# Patient Record
Sex: MALE | Race: WHITE | Employment: FULL TIME | URBAN - METROPOLITAN AREA
[De-identification: names, ages, dates, MRNs, and addresses within clinical notes are randomized per-mention and may not be internally consistent; named-entity substitution may affect disease eponyms.]

---
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
## 2020-08-31 ENCOUNTER — OFFICE VISIT (OUTPATIENT)
Dept: FAMILY MEDICINE CLINIC | Age: 20
End: 2020-08-31
Payer: COMMERCIAL

## 2020-08-31 VITALS
DIASTOLIC BLOOD PRESSURE: 74 MMHG | TEMPERATURE: 98.6 F | OXYGEN SATURATION: 98 % | SYSTOLIC BLOOD PRESSURE: 126 MMHG | WEIGHT: 185.6 LBS | HEART RATE: 72 BPM

## 2020-08-31 PROCEDURE — G8427 DOCREV CUR MEDS BY ELIG CLIN: HCPCS | Performed by: FAMILY MEDICINE

## 2020-08-31 PROCEDURE — G8421 BMI NOT CALCULATED: HCPCS | Performed by: FAMILY MEDICINE

## 2020-08-31 PROCEDURE — 1036F TOBACCO NON-USER: CPT | Performed by: FAMILY MEDICINE

## 2020-08-31 PROCEDURE — 99213 OFFICE O/P EST LOW 20 MIN: CPT | Performed by: FAMILY MEDICINE

## 2020-08-31 RX ORDER — DEXTROAMPHETAMINE SACCHARATE, AMPHETAMINE ASPARTATE MONOHYDRATE, DEXTROAMPHETAMINE SULFATE AND AMPHETAMINE SULFATE 3.75; 3.75; 3.75; 3.75 MG/1; MG/1; MG/1; MG/1
15 CAPSULE, EXTENDED RELEASE ORAL EVERY MORNING
Qty: 30 CAPSULE | Refills: 0 | Status: SHIPPED | OUTPATIENT
Start: 2020-08-31 | End: 2020-09-28

## 2020-08-31 ASSESSMENT — PATIENT HEALTH QUESTIONNAIRE - PHQ9
1. LITTLE INTEREST OR PLEASURE IN DOING THINGS: 0
SUM OF ALL RESPONSES TO PHQ9 QUESTIONS 1 & 2: 0
2. FEELING DOWN, DEPRESSED OR HOPELESS: 0
SUM OF ALL RESPONSES TO PHQ QUESTIONS 1-9: 0
SUM OF ALL RESPONSES TO PHQ QUESTIONS 1-9: 0

## 2020-08-31 ASSESSMENT — ENCOUNTER SYMPTOMS
GASTROINTESTINAL NEGATIVE: 1
RESPIRATORY NEGATIVE: 1

## 2020-08-31 NOTE — PROGRESS NOTES
Subjective:      Patient ID: Amira Cordova is a 23 y.o. male. HPI  Patient states he is having difficulty concentrating in school. He also states that one time he was prescribed Adderall however he never only took oneprescription up and tried it. This was in February 2019. Started [employer] 2 weeks ago and is doing virtual learning at this point. He states he has no other health issues at this time. Review of Systems   Constitutional: Negative. HENT: Negative. Respiratory: Negative. Cardiovascular: Negative. Gastrointestinal: Negative. Genitourinary: Negative. Musculoskeletal: Negative. Neurological: Negative. Psychiatric/Behavioral:        Per HPI       Objective:   Physical Exam  Constitutional:       General: He is not in acute distress. Cardiovascular:      Rate and Rhythm: Normal rate and regular rhythm. Pulmonary:      Effort: Pulmonary effort is normal.      Breath sounds: Normal breath sounds. No wheezing or rales. Skin:     General: Skin is warm and dry. Neurological:      Mental Status: He is alert and oriented to person, place, and time. Psychiatric:         Behavior: Behavior normal.         Thought Content: Thought content normal.         Judgment: Judgment normal.         Assessment/Plan:    Jenifer Friedman was seen today for discuss medications. Diagnoses and all orders for this visit:    Attention deficit disorder (ADD) without hyperactivity  -     amphetamine-dextroamphetamine (ADDERALL XR) 15 MG extended release capsule; Take 1 capsule by mouth every morning for 30 days. We discussed taking drug holidays on weekends or days he does not have school. He was amenable to doing that. Return as needed or if dosage adjustment is needed.     Jocy Knapp MD

## 2020-09-28 ENCOUNTER — OFFICE VISIT (OUTPATIENT)
Dept: FAMILY MEDICINE CLINIC | Age: 20
End: 2020-09-28
Payer: COMMERCIAL

## 2020-09-28 VITALS
SYSTOLIC BLOOD PRESSURE: 130 MMHG | DIASTOLIC BLOOD PRESSURE: 86 MMHG | WEIGHT: 185.2 LBS | HEART RATE: 72 BPM | OXYGEN SATURATION: 99 %

## 2020-09-28 PROCEDURE — 1036F TOBACCO NON-USER: CPT | Performed by: FAMILY MEDICINE

## 2020-09-28 PROCEDURE — G8421 BMI NOT CALCULATED: HCPCS | Performed by: FAMILY MEDICINE

## 2020-09-28 PROCEDURE — 99213 OFFICE O/P EST LOW 20 MIN: CPT | Performed by: FAMILY MEDICINE

## 2020-09-28 PROCEDURE — G8427 DOCREV CUR MEDS BY ELIG CLIN: HCPCS | Performed by: FAMILY MEDICINE

## 2020-09-28 RX ORDER — DEXTROAMPHETAMINE SACCHARATE, AMPHETAMINE ASPARTATE MONOHYDRATE, DEXTROAMPHETAMINE SULFATE AND AMPHETAMINE SULFATE 5; 5; 5; 5 MG/1; MG/1; MG/1; MG/1
20 CAPSULE, EXTENDED RELEASE ORAL EVERY MORNING
Qty: 30 CAPSULE | Refills: 0 | Status: SHIPPED | OUTPATIENT
Start: 2020-09-28 | End: 2020-12-07 | Stop reason: SDUPTHER

## 2020-09-28 ASSESSMENT — ENCOUNTER SYMPTOMS
GASTROINTESTINAL NEGATIVE: 1
RESPIRATORY NEGATIVE: 1

## 2020-09-28 NOTE — PROGRESS NOTES
Subjective:      Patient ID: Orly Ambrocio is a 23 y.o. male. HPI  Patient was seen on 8/31 for initiation of Adderall XR 15 mg for ADD. He is virtually attending the [facility] of [address] [place] [address]. He seems like he needs a higher dose. It has worked but could be working better. Mild loss of appetite otherwise no side effects from the Adderall. Review of Systems   Constitutional: Positive for appetite change ( Mild). HENT: Negative. Respiratory: Negative. Cardiovascular: Negative. Gastrointestinal: Negative. Genitourinary: Negative. Musculoskeletal: Negative. Neurological: Negative. Psychiatric/Behavioral: Negative for sleep disturbance. Per HPI       Objective:   Physical Exam  Constitutional:       General: He is not in acute distress. Cardiovascular:      Rate and Rhythm: Normal rate and regular rhythm. Pulmonary:      Effort: Pulmonary effort is normal.      Breath sounds: Normal breath sounds. No wheezing or rales. Skin:     General: Skin is warm and dry. Neurological:      Mental Status: He is alert and oriented to person, place, and time. Psychiatric:         Behavior: Behavior normal.         Thought Content: Thought content normal.         Judgment: Judgment normal.         Assessment/Plan:    [place] was seen today for discuss medications. Diagnoses and all orders for this visit:    Attention deficit disorder (ADD) without hyperactivity  Increase Adderall from 15 mg to 20 mg  -     amphetamine-dextroamphetamine (ADDERALL XR) 20 MG extended release capsule; Take 1 capsule by mouth every morning for 30 days. Return 1 month if dosage adjustment is needed otherwise can return in 3 months. Okay to do a virtual visit for either visit.   OARRS report reviewed and no inconsistencies noted        Dayanna Hoffmann MD

## 2020-12-07 ENCOUNTER — OFFICE VISIT (OUTPATIENT)
Dept: FAMILY MEDICINE CLINIC | Age: 20
End: 2020-12-07
Payer: COMMERCIAL

## 2020-12-07 ENCOUNTER — TELEPHONE (OUTPATIENT)
Dept: FAMILY MEDICINE CLINIC | Age: 20
End: 2020-12-07

## 2020-12-07 VITALS
WEIGHT: 187.8 LBS | DIASTOLIC BLOOD PRESSURE: 82 MMHG | HEART RATE: 86 BPM | SYSTOLIC BLOOD PRESSURE: 128 MMHG | OXYGEN SATURATION: 97 %

## 2020-12-07 PROCEDURE — 90686 IIV4 VACC NO PRSV 0.5 ML IM: CPT | Performed by: FAMILY MEDICINE

## 2020-12-07 PROCEDURE — G8421 BMI NOT CALCULATED: HCPCS | Performed by: FAMILY MEDICINE

## 2020-12-07 PROCEDURE — 99213 OFFICE O/P EST LOW 20 MIN: CPT | Performed by: FAMILY MEDICINE

## 2020-12-07 PROCEDURE — G8427 DOCREV CUR MEDS BY ELIG CLIN: HCPCS | Performed by: FAMILY MEDICINE

## 2020-12-07 PROCEDURE — 90471 IMMUNIZATION ADMIN: CPT | Performed by: FAMILY MEDICINE

## 2020-12-07 PROCEDURE — 96372 THER/PROPH/DIAG INJ SC/IM: CPT | Performed by: FAMILY MEDICINE

## 2020-12-07 PROCEDURE — G8482 FLU IMMUNIZE ORDER/ADMIN: HCPCS | Performed by: FAMILY MEDICINE

## 2020-12-07 PROCEDURE — 1036F TOBACCO NON-USER: CPT | Performed by: FAMILY MEDICINE

## 2020-12-07 RX ORDER — DEXTROAMPHETAMINE SACCHARATE, AMPHETAMINE ASPARTATE MONOHYDRATE, DEXTROAMPHETAMINE SULFATE AND AMPHETAMINE SULFATE 5; 5; 5; 5 MG/1; MG/1; MG/1; MG/1
20 CAPSULE, EXTENDED RELEASE ORAL EVERY MORNING
Qty: 30 CAPSULE | Refills: 0 | Status: SHIPPED | OUTPATIENT
Start: 2020-12-07 | End: 2021-05-06 | Stop reason: SDUPTHER

## 2020-12-07 ASSESSMENT — ENCOUNTER SYMPTOMS
GASTROINTESTINAL NEGATIVE: 1
RESPIRATORY NEGATIVE: 1

## 2020-12-07 NOTE — TELEPHONE ENCOUNTER
Pt is wants to know if Dr Vitaly Stinson can give him an injection that's from another dr. (?)  Please call pt and advise.   Pt will be coming to the office for his appt later today

## 2020-12-07 NOTE — TELEPHONE ENCOUNTER
Spoke with Dr. Jacqueline Hernandez and he states if pt is due for the [facility], can be given in the office.  Pt notified

## 2020-12-07 NOTE — TELEPHONE ENCOUNTER
Spoke with pt he was wanting to know if you would be willing to give him his injection for his bioplar Jessica Patton).  Pt states he was not able to get in to his other provider at the [facility] that normally gives him the injection

## 2020-12-07 NOTE — TELEPHONE ENCOUNTER
Pt is asking to change OV for today at 4:15 to VV. If so please send link to [phone].  Please contact pt at same # to advise

## 2020-12-07 NOTE — PROGRESS NOTES
Subjective:      Patient ID: Saúl Johnson is a 21 y.o. male. HPI  ADD follow up. States the medication continues to work well and calms him down and helps him focus. Also was diagnosed at [facility] since [place] started with bipol;ar with manic episodes and has been getting Invega 234 mg IM since then. He is due for an injection now and has a prefilled syringe that he brought with him. He states he was not able to get to the [facility] to receive this. Review of Systems   Constitutional: Negative. Respiratory: Negative. Cardiovascular: Negative. Gastrointestinal: Negative. Genitourinary: Negative. Musculoskeletal: Negative. Neurological: Negative. Psychiatric/Behavioral: Negative. Patient denies any symptoms currently       Objective:   Physical Exam  Constitutional:       General: He is not in acute distress. Cardiovascular:      Rate and Rhythm: Normal rate and regular rhythm. Pulmonary:      Effort: Pulmonary effort is normal.      Breath sounds: Normal breath sounds. No wheezing or rales. Skin:     General: Skin is warm and dry. Neurological:      Mental Status: He is alert and oriented to person, place, and time. Psychiatric:         Behavior: Behavior normal.         Thought Content: Thought content normal.         Judgment: Judgment normal.         Assessment/Plan:    Chiqui Chu was seen today for adhd and discuss medications. Diagnoses and all orders for this visit:    Attention deficit disorder (ADD) without hyperactivity  -     amphetamine-dextroamphetamine (ADDERALL XR) 20 MG extended release capsule; Take 1 capsule by mouth every morning for 30 days.   OARRS report reviewed and no inconsistencies noted   The patient understands the risks of dependency/addiction with Adderall and will take as little as possible and discontinue as soon as possible  Return 3-month  Bipolar 1 disorder, manic, mild (HCC)  -     paliperidone palmitate ER (Da Andrews) IM injection 234 mg    Need for vaccination  -     INFLUENZA, QUADV, 3 YRS AND OLDER, IM PF, PREFILL SYR OR SDV, 0.5ML (Nichelle Jsoe, BARBARA)         Argentina Jacome MD

## 2021-01-04 ENCOUNTER — OFFICE VISIT (OUTPATIENT)
Dept: FAMILY MEDICINE CLINIC | Age: 21
End: 2021-01-04
Payer: COMMERCIAL

## 2021-01-04 VITALS
OXYGEN SATURATION: 97 % | SYSTOLIC BLOOD PRESSURE: 100 MMHG | HEART RATE: 93 BPM | WEIGHT: 185 LBS | TEMPERATURE: 98.1 F | DIASTOLIC BLOOD PRESSURE: 70 MMHG

## 2021-01-04 DIAGNOSIS — F31.11 BIPOLAR 1 DISORDER, MANIC, MILD (HCC): Primary | ICD-10-CM

## 2021-01-04 PROCEDURE — G8421 BMI NOT CALCULATED: HCPCS | Performed by: FAMILY MEDICINE

## 2021-01-04 PROCEDURE — G8482 FLU IMMUNIZE ORDER/ADMIN: HCPCS | Performed by: FAMILY MEDICINE

## 2021-01-04 PROCEDURE — 1036F TOBACCO NON-USER: CPT | Performed by: FAMILY MEDICINE

## 2021-01-04 PROCEDURE — 96372 THER/PROPH/DIAG INJ SC/IM: CPT | Performed by: FAMILY MEDICINE

## 2021-01-04 PROCEDURE — 99213 OFFICE O/P EST LOW 20 MIN: CPT | Performed by: FAMILY MEDICINE

## 2021-01-04 PROCEDURE — G8427 DOCREV CUR MEDS BY ELIG CLIN: HCPCS | Performed by: FAMILY MEDICINE

## 2021-01-04 NOTE — PATIENT INSTRUCTIONS
to lower your stress. Manage your time, build a strong support system, and lead a healthy lifestyle. To lower your stress, try physical activity, slow deep breathing, or getting a massage. · Do not use alcohol, marijuana, or illegal drugs. · Learn the early signs of your mood changes. You can then take steps to help yourself feel better. · Ask for help from friends and family when you need it. You may need help with daily chores when you are depressed. When you are manic, you may need support to control your high energy levels. What should you do if someone in your family has bipolar disorder? · Learn about the disease and signs it's getting worse. · Remind your family member you love them. · Make a plan with all family members about how to take care of your loved one when symptoms are bad. · Remind yourself it will take time for changes to occur. · Try not to blame yourself for the disease. · Know your legal rights and the legal rights of your family member. Support groups or counselors can help with this information. · Take care of yourself. Keep up with your interests, such as career, hobbies, and friends. Use exercise, positive self-talk, deep breathing, and other relaxing exercises to help lower your stress. · Give yourself time to grieve. You may need to deal with emotions such as anger, fear, and frustration. · If you are having a hard time with your feelings or with your relationship with your family member, talk with a counselor. When should you call for help? Call 911 anytime you think you may need emergency care. For example, call if:    · You feel like hurting yourself or someone else.     · Someone who has bipolar disorder displays dangerous behavior, and you think the person might hurt himself or herself or someone else. Call your doctor now or seek immediate medical care if:    · You hear voices.     · Someone you know has bipolar disorder and talks about suicide.  Keep the numbers for these national suicide hotlines: 2-450-053-TALK (8-538.200.3235) and 5-105-UWMPIKY (1-184.185.4303). If a suicide threat seems real, with a specific plan and a way to carry it out, stay with the person, or ask someone you trust to stay with the person, until you can get help.     · Someone you know has bipolar disorder and:  ? Starts to give away possessions. ? Is using illegal drugs or drinking alcohol heavily. ? Talks or writes about death, including writing suicide notes or talking about guns, knives, or pills. ? Talks or writes about hurting someone else. ? Starts to spend a lot of time alone. ? Acts very aggressively or suddenly appears calm. ? Talks about beliefs that are not based in reality (delusions). Watch closely for changes in your health, and be sure to contact your doctor if:    · You cannot go to your counseling sessions. Where can you learn more? Go to https://Vine [email or web address]. org and sign in to your [employer] account. Enter K052 in the 123ContactForm box to learn more about \"Bipolar Disorder: Care Instructions. \"     If you do not have an account, please click on the \"Sign Up Now\" link. Current as of: January 31, 2020               Content Version: 12.6  © 7777-4616 [employer]. Care instructions adapted under license by [facility] ([facility]). If you have questions about a medical condition or this instruction, always ask your healthcare professional. [place] [address] any warranty or liability for your use of this information.

## 2021-01-04 NOTE — PROGRESS NOTES
([facility].)    - paliperidone palmitate ER (INVEGA SUSTENNA) IM injection 234 mg       No orders of the defined types were placed in this encounter. No follow-ups on file.     Miky Bravo MD    1/4/2021  3:52 PM

## 2021-02-05 ENCOUNTER — OFFICE VISIT (OUTPATIENT)
Dept: FAMILY MEDICINE CLINIC | Age: 21
End: 2021-02-05
Payer: COMMERCIAL

## 2021-02-05 VITALS
OXYGEN SATURATION: 98 % | HEART RATE: 85 BPM | DIASTOLIC BLOOD PRESSURE: 84 MMHG | SYSTOLIC BLOOD PRESSURE: 128 MMHG | WEIGHT: 183.6 LBS

## 2021-02-05 DIAGNOSIS — F98.8 ATTENTION DEFICIT DISORDER (ADD) WITHOUT HYPERACTIVITY: ICD-10-CM

## 2021-02-05 DIAGNOSIS — F31.11 BIPOLAR 1 DISORDER, MANIC, MILD (HCC): Primary | ICD-10-CM

## 2021-02-05 PROCEDURE — 96372 THER/PROPH/DIAG INJ SC/IM: CPT | Performed by: FAMILY MEDICINE

## 2021-02-05 PROCEDURE — 99213 OFFICE O/P EST LOW 20 MIN: CPT | Performed by: FAMILY MEDICINE

## 2021-02-05 PROCEDURE — 1036F TOBACCO NON-USER: CPT | Performed by: FAMILY MEDICINE

## 2021-02-05 PROCEDURE — G8427 DOCREV CUR MEDS BY ELIG CLIN: HCPCS | Performed by: FAMILY MEDICINE

## 2021-02-05 PROCEDURE — G8421 BMI NOT CALCULATED: HCPCS | Performed by: FAMILY MEDICINE

## 2021-02-05 PROCEDURE — G8482 FLU IMMUNIZE ORDER/ADMIN: HCPCS | Performed by: FAMILY MEDICINE

## 2021-02-05 ASSESSMENT — ENCOUNTER SYMPTOMS
GASTROINTESTINAL NEGATIVE: 1
RESPIRATORY NEGATIVE: 1

## 2021-02-05 NOTE — PROGRESS NOTES
Preston Albarado (:  2000) is a 21 y.o. male,Established patient, here for evaluation of the following chief complaint(s):  Follow-up and Injections      ASSESSMENT/PLAN:  1. Bipolar 1 disorder, manic, mild (HCC)  -     paliperidone palmitate ER (INVEGA SUSTENNA) IM injection 234 mg; 234 mg, Intramuscular, ONCE, Fri 21 at 1045, For 1 doseDeltoid injection: For patients weighing less than 90 kg, the 1-inch, 23-gauge needle is recommended. For patients weighing 90 kg or more, the 1½-inch, 22-gauge needle is recommended. Gluteal muscle injection: Use the 1½-inch, 22-gauge needle regardless of patient weight. Please refer to package insert for full admin instructions. Return 1 month  2. Attention deficit disorder (ADD) without hyperactivity  OARRS report reviewed and no inconsistencies noted   Return in about 3 months (around 2021) for OARRS, med check. SUBJECTIVE/OBJECTIVE:  HPI  Patient returns for follow-up of his ADD but specifically for bipolar to receive his Invega injection. Despite the fact that he has not had his injection for nearly 2 months he states he has not been symptomatic. He continues to work and attend college at [employer]. Review of Systems   Constitutional: Negative. Respiratory: Negative. Cardiovascular: Negative. Gastrointestinal: Negative. Genitourinary: Negative. Musculoskeletal: Negative. Skin: Negative. Neurological: Negative. Psychiatric/Behavioral: Negative. Physical Exam  Constitutional:       General: He is not in acute distress. Cardiovascular:      Rate and Rhythm: Normal rate and regular rhythm. Pulmonary:      Effort: Pulmonary effort is normal.      Breath sounds: Normal breath sounds. No wheezing or rales. Skin:     General: Skin is warm and dry. Neurological:      Mental Status: He is alert and oriented to person, place, and time.    Psychiatric:         Behavior: Behavior normal. Thought Content: Thought content normal.         Judgment: Judgment normal.                 An electronic signature was used to authenticate this note.     --Kathy Virk MD

## 2021-03-08 ENCOUNTER — OFFICE VISIT (OUTPATIENT)
Dept: FAMILY MEDICINE CLINIC | Age: 21
End: 2021-03-08
Payer: COMMERCIAL

## 2021-03-08 VITALS
SYSTOLIC BLOOD PRESSURE: 110 MMHG | TEMPERATURE: 97.5 F | HEART RATE: 70 BPM | OXYGEN SATURATION: 99 % | DIASTOLIC BLOOD PRESSURE: 70 MMHG | WEIGHT: 195 LBS

## 2021-03-08 DIAGNOSIS — F31.11 BIPOLAR 1 DISORDER, MANIC, MILD (HCC): Primary | ICD-10-CM

## 2021-03-08 DIAGNOSIS — F98.8 ATTENTION DEFICIT DISORDER (ADD) WITHOUT HYPERACTIVITY: ICD-10-CM

## 2021-03-08 PROCEDURE — 99213 OFFICE O/P EST LOW 20 MIN: CPT | Performed by: FAMILY MEDICINE

## 2021-03-08 PROCEDURE — 1036F TOBACCO NON-USER: CPT | Performed by: FAMILY MEDICINE

## 2021-03-08 PROCEDURE — G8482 FLU IMMUNIZE ORDER/ADMIN: HCPCS | Performed by: FAMILY MEDICINE

## 2021-03-08 PROCEDURE — G8427 DOCREV CUR MEDS BY ELIG CLIN: HCPCS | Performed by: FAMILY MEDICINE

## 2021-03-08 PROCEDURE — G8421 BMI NOT CALCULATED: HCPCS | Performed by: FAMILY MEDICINE

## 2021-03-08 PROCEDURE — 96372 THER/PROPH/DIAG INJ SC/IM: CPT | Performed by: FAMILY MEDICINE

## 2021-03-08 ASSESSMENT — ENCOUNTER SYMPTOMS
RESPIRATORY NEGATIVE: 1
GASTROINTESTINAL NEGATIVE: 1

## 2021-03-08 NOTE — PROGRESS NOTES
Ale Lawler (:  2000) is a 21 y.o. male,Established patient, here for evaluation of the following chief complaint(s):  Injections (monthly)      ASSESSMENT/PLAN:  1. Bipolar 1 disorder, manic, mild ([facility]  Patient states he remains stable and asymptomatic on the below medication. -     paliperidone palmitate ER (INVEGA SUSTENNA) IM injection 234 mg; 234 mg, Intramuscular, ONCE, 21 at 1045, For 1 doseDeltoid injection: For patients weighing less than 90 kg, the 1-inch, 23-gauge needle is recommended. For patients weighing 90 kg or more, the 1½-inch, 22-gauge needle is recommended. Gluteal muscle injection: Use the 1½-inch, 22-gauge needle regardless of patient weight. Please refer to package insert for full admin instructions. Return 1 month  2. Attention deficit disorder (ADD) without hyperactivity  OARRS report reviewed and no inconsistencies noted. Has not filled Adderall since 2020  Return in about 3 months (around 2021) for OARRS, med check. Return in about 1 month (around 2021), or if symptoms worsen or fail to improve. SUBJECTIVE/OBJECTIVE:  HPI    Patient returns for follow-up and injection of his bipolar medication i.e. his Invega injection. He last received this 1 month ago. He states he has been asymptomatic and feels well. As noted previously he continues to work and attend college at [employer] with his spring break coming in approximately 2 weeks. He remains on his ADD medication when he needs it. Review of Systems   Constitutional: Negative. HENT: Negative. Respiratory: Negative. Cardiovascular: Negative. Gastrointestinal: Negative. Genitourinary: Negative. Musculoskeletal: Negative. Neurological: Negative. Psychiatric/Behavioral:        Per HPI       Physical Exam  Constitutional:       General: He is not in acute distress. Cardiovascular:      Rate and Rhythm: Normal rate and regular rhythm.    Pulmonary: Effort: Pulmonary effort is normal.      Breath sounds: Normal breath sounds. No wheezing or rales. Skin:     General: Skin is warm and dry. Neurological:      Mental Status: He is alert and oriented to person, place, and time. Psychiatric:         Behavior: Behavior normal.         Thought Content: Thought content normal.         Judgment: Judgment normal.                 An electronic signature was used to authenticate this note.     --Keyonna Tatum MD

## 2021-04-05 ENCOUNTER — OFFICE VISIT (OUTPATIENT)
Dept: FAMILY MEDICINE CLINIC | Age: 21
End: 2021-04-05
Payer: COMMERCIAL

## 2021-04-05 VITALS
WEIGHT: 198 LBS | OXYGEN SATURATION: 99 % | DIASTOLIC BLOOD PRESSURE: 60 MMHG | HEART RATE: 94 BPM | SYSTOLIC BLOOD PRESSURE: 110 MMHG | TEMPERATURE: 97.9 F

## 2021-04-05 DIAGNOSIS — M25.522 ELBOW PAIN, LEFT: Primary | ICD-10-CM

## 2021-04-05 PROCEDURE — 99213 OFFICE O/P EST LOW 20 MIN: CPT | Performed by: FAMILY MEDICINE

## 2021-04-05 PROCEDURE — 1036F TOBACCO NON-USER: CPT | Performed by: FAMILY MEDICINE

## 2021-04-05 PROCEDURE — G8421 BMI NOT CALCULATED: HCPCS | Performed by: FAMILY MEDICINE

## 2021-04-05 PROCEDURE — G8427 DOCREV CUR MEDS BY ELIG CLIN: HCPCS | Performed by: FAMILY MEDICINE

## 2021-04-05 RX ORDER — PREDNISONE 10 MG/1
TABLET ORAL
Qty: 30 TABLET | Refills: 0 | Status: SHIPPED | OUTPATIENT
Start: 2021-04-05 | End: 2021-05-06

## 2021-04-05 NOTE — PROGRESS NOTES
[address] Note    Date: 4/5/2021                                               Subjective/Objective:     Chief Complaint   Patient presents with    Joint Swelling     pain left        HPI   L elbow pain starting a few years ago. Intermittent. Feels it mostly with lifting at the gym. Is located posteriorly just above the olecranon. Goes away completely at times. Denies injury. Hasn't tried any medicines. Patient Active Problem List    Diagnosis Date Noted    Attention deficit disorder (ADD) without hyperactivity 03/16/2018       Past Medical History:   Diagnosis Date    Osteochondritis dissecans of knee bilateral       Current Outpatient Medications   Medication Sig Dispense Refill    predniSONE (DELTASONE) 10 MG tablet Take 4 tablets on days 1-3, 3 tab on days 4-6, 2 tab on days 7-9, and 1 tab on days 10-12 30 tablet 0    amphetamine-dextroamphetamine (ADDERALL XR) 20 MG extended release capsule Take 1 capsule by mouth every morning for 30 days. 30 capsule 0     No current facility-administered medications for this visit. Allergies   Allergen Reactions    Percocet [Oxycodone-Acetaminophen] Nausea And Vomiting       Review of Systems   No fever, no cough    Vitals:  /60   Pulse 94   Temp 97.9 °F (36.6 °C)   Wt 198 lb (89.8 kg)   SpO2 99%     Physical Exam   General:  Well-appearing, NAD, alert, non-toxic  HEENT:  Normocephalic, atraumatic. CHEST/LUNGS: No dyspnea  EXTREMETIES: Normal movement of all extremities. No edema. No joint swelling. No tenderness palpation of left elbow. SKIN:  No rash, no cellulitis, no bruising, no petechiae/purpura/vesicles/pustules/abscess  PSYCH:  A+O x 3; normal affect  NEURO:  GCS 15, CN2-12 grossly intact, no focal motor/sensory deficits, normal gait, normal speech      Assessment/Plan     27-year-old male with left elbow pain. Seems likely tendinitis at the insertion of the tricep. Recommend rest, ice, prednisone taper. If symptoms not resolved, may need to see arm specialist for further evaluation treatment. Discussed with patient medication/s dosage, instructions, potential S/E, A/R and Drug Interaction  Advise to return here if worse or go to nearest ER  Encourage fluids  Pt discharged in stable condition at 15:50      1. Elbow pain, left    - predniSONE (DELTASONE) 10 MG tablet; Take 4 tablets on days 1-3, 3 tab on days 4-6, 2 tab on days 7-9, and 1 tab on days 10-12  Dispense: 30 tablet; Refill: 0  - Lea De Jesus MD, Hand Surgery (Hand, Wrist, Upper Extremity), [facility]         Orders Placed This Encounter   Procedures   Lea De Jesus MD, Hand Surgery (Hand, Wrist, Upper Extremity), [facility]     Referral Priority:   Routine     Referral Type:   Eval and Treat     Referral Reason:   Specialty Services Required     Referred to Provider:   Luiz Jha MD     Requested Specialty:   Orthopedic Surgery     Number of Visits Requested:   1       Return if symptoms worsen or fail to improve.     Michelle Kothari MD    4/5/2021  3:49 PM

## 2021-04-05 NOTE — PATIENT INSTRUCTIONS
Patient Education        Triceps Tendinitis: Exercises  Introduction  Here are some examples of exercises for you to try. The exercises may be suggested for a condition or for rehabilitation. Start each exercise slowly. Ease off the exercises if you start to have pain. You will be told when to start these exercises and which ones will work best for you. How to do the exercises  Elbow flexion and extension   1. Stand with your arms relaxed at your sides. 2. With your affected arm, gently bend your elbow up toward you as far as possible. Your palm should face up. 3. Then straighten your arm as much as you can. 4. Repeat 2 to 4 times. Triceps stretch   1. Stand with your back straight and your feet shoulder-width apart. You can do this stretch sitting down if you are not steady on your feet. 2. Bring your left elbow straight up while bending your arm. 3. Grab your left elbow with your right hand, and pull your left elbow toward your head with light pressure. If you are more flexible, you may pull your arm slightly behind your head. You will feel the stretch along the back of your arm. 4. Hold 15 to 30 seconds, then switch elbows. 5. Repeat 2 to 4 times for each arm. Elbow extension: Resisted chair stand   1. Sit in a firm chair with sturdy armrests. Keep your feet flat on the floor. 2. Place your hands on the armrests and push yourself up from the chair, keeping your hands on the armrests. 3. Use your legs as needed to push yourself up. 4. Return slowly to your seated position. 5. Repeat 8 to 12 times. Triceps extension   For the next two exercises, you will need elastic exercise material, such as surgical tubing or Thera-Band. 1. Tie an exercise band so that there is a loop or a knot in the middle. Attach the loop or knot to a secure object, or shut a door on the knot to hold it in place. The band should be attached at a place that is above your head.   2. Stand so you face the place where you have secured the band. Hold one end of the band in each hand, with your hands at shoulder level. 3. Keep your elbows tucked in at your sides. 4. Slowly pull the bands down so your arms are straight at your sides. Make sure you keep your elbows close to your body as you pull down. 5. Return slowly to your starting position. 6. Repeat 8 to 12 times. Pulldown   1. Tie an exercise band so that there is a loop or a knot in the middle. Attach the loop to a secure object or shut a door on the knot to hold it in place. The band should be attached at a place that is above your head. 2. Stand so you face the place where you have secured the band. 3. Hold one end of the band in each hand. Your hands should be at about forehead level, with your arms stretched out in front of you. Hold your hands a little farther apart than the width of your shoulders. 4. Slowly pull the bands down to chest level so that your elbows are bent and your hands are at chest level. 5. Slowly return to your starting position. 6. Repeat 8 to 12 times. Follow-up care is a key part of your treatment and safety. Be sure to make and go to all appointments, and call your doctor if you are having problems. It's also a good idea to know your test results and keep a list of the medicines you take. Where can you learn more? Go to https://[email or web address].[email or web address]. org and sign in to your [employer] account. Enter B767 in the [facility] box to learn more about \"Triceps Tendinitis: Exercises. \"     If you do not have an account, please click on the \"Sign Up Now\" link. Current as of: November 16, 2020               Content Version: 12.8  © 9148-8941 Healthwise, Incorporated. Care instructions adapted under license by [facility] ([facility]).  If you have questions about a medical condition or this instruction, always ask your healthcare professional. [address] any warranty or liability for your use of this information.

## 2021-05-06 ENCOUNTER — OFFICE VISIT (OUTPATIENT)
Dept: FAMILY MEDICINE CLINIC | Age: 21
End: 2021-05-06
Payer: COMMERCIAL

## 2021-05-06 VITALS
DIASTOLIC BLOOD PRESSURE: 78 MMHG | SYSTOLIC BLOOD PRESSURE: 118 MMHG | OXYGEN SATURATION: 99 % | WEIGHT: 198 LBS | HEART RATE: 78 BPM

## 2021-05-06 DIAGNOSIS — F98.8 ATTENTION DEFICIT DISORDER (ADD) WITHOUT HYPERACTIVITY: ICD-10-CM

## 2021-05-06 DIAGNOSIS — F31.11 BIPOLAR 1 DISORDER, MANIC, MILD (HCC): Primary | ICD-10-CM

## 2021-05-06 DIAGNOSIS — R68.82 DECREASED LIBIDO: ICD-10-CM

## 2021-05-06 PROCEDURE — 1036F TOBACCO NON-USER: CPT | Performed by: FAMILY MEDICINE

## 2021-05-06 PROCEDURE — 99214 OFFICE O/P EST MOD 30 MIN: CPT | Performed by: FAMILY MEDICINE

## 2021-05-06 PROCEDURE — G8427 DOCREV CUR MEDS BY ELIG CLIN: HCPCS | Performed by: FAMILY MEDICINE

## 2021-05-06 PROCEDURE — G8421 BMI NOT CALCULATED: HCPCS | Performed by: FAMILY MEDICINE

## 2021-05-06 RX ORDER — DEXTROAMPHETAMINE SACCHARATE, AMPHETAMINE ASPARTATE MONOHYDRATE, DEXTROAMPHETAMINE SULFATE AND AMPHETAMINE SULFATE 5; 5; 5; 5 MG/1; MG/1; MG/1; MG/1
20 CAPSULE, EXTENDED RELEASE ORAL EVERY MORNING
Qty: 30 CAPSULE | Refills: 0 | Status: SHIPPED | OUTPATIENT
Start: 2021-05-06 | End: 2021-05-10 | Stop reason: SDUPTHER

## 2021-05-06 NOTE — PROGRESS NOTES
[address] Note    Date: 5/6/2021                                               Subjective/Objective:     Chief Complaint   Patient presents with    Check-Up     testosterone shot        HPI   Patient is here for follow-up on bipolar disorder. Stopped taking Invega because he thinks is causing sexual side effects. Reports that he has decreased libido. Also reports decreased energy. Has not been exercising lately. Would like to have his testosterone checked. Reports that his moods are stable. Magda Cinthya was working well to control his moods. Patient has history of ADD. Started taking his Adderall again a month ago, and is working well. Currently works full-time in a furniture factory, also works part-time bussing tables at [employer]. Denies depressed mood. Denies SI/HI. Patient Active Problem List    Diagnosis Date Noted    Attention deficit disorder (ADD) without hyperactivity 03/16/2018       Past Medical History:   Diagnosis Date    Osteochondritis dissecans of knee bilateral       Current Outpatient Medications   Medication Sig Dispense Refill    amphetamine-dextroamphetamine (ADDERALL XR) 20 MG extended release capsule Take 1 capsule by mouth every morning for 30 days. 30 capsule 0     No current facility-administered medications for this visit. Allergies   Allergen Reactions    Percocet [Oxycodone-Acetaminophen] Nausea And Vomiting       Review of Systems   No fever, no vomiting    Vitals:  /78   Pulse 78   Wt 198 lb (89.8 kg)   SpO2 99%     Physical Exam   General:  Well-appearing, NAD, alert, non-toxic  HEENT:  Normocephalic, atraumatic. CHEST/LUNGS: No dyspnea  EXTREMETIES: Normal movement of all extremities. No edema. No joint swelling.   SKIN:  No rash, no cellulitis, no bruising, no petechiae/purpura/vesicles/pustules/abscess  PSYCH:  A+O x 3; normal affect  NEURO:  GCS 15, CN2-12 grossly intact, no focal motor/sensory deficits,

## 2021-05-10 ENCOUNTER — OFFICE VISIT (OUTPATIENT)
Dept: FAMILY MEDICINE CLINIC | Age: 21
End: 2021-05-10
Payer: COMMERCIAL

## 2021-05-10 VITALS
SYSTOLIC BLOOD PRESSURE: 110 MMHG | HEART RATE: 75 BPM | OXYGEN SATURATION: 99 % | WEIGHT: 198 LBS | DIASTOLIC BLOOD PRESSURE: 70 MMHG

## 2021-05-10 DIAGNOSIS — F31.11 BIPOLAR 1 DISORDER, MANIC, MILD (HCC): Primary | ICD-10-CM

## 2021-05-10 DIAGNOSIS — F98.8 ATTENTION DEFICIT DISORDER (ADD) WITHOUT HYPERACTIVITY: ICD-10-CM

## 2021-05-10 PROCEDURE — G8427 DOCREV CUR MEDS BY ELIG CLIN: HCPCS | Performed by: FAMILY MEDICINE

## 2021-05-10 PROCEDURE — G8421 BMI NOT CALCULATED: HCPCS | Performed by: FAMILY MEDICINE

## 2021-05-10 PROCEDURE — 1036F TOBACCO NON-USER: CPT | Performed by: FAMILY MEDICINE

## 2021-05-10 PROCEDURE — 99213 OFFICE O/P EST LOW 20 MIN: CPT | Performed by: FAMILY MEDICINE

## 2021-05-10 PROCEDURE — 96372 THER/PROPH/DIAG INJ SC/IM: CPT | Performed by: FAMILY MEDICINE

## 2021-05-10 RX ORDER — DEXTROAMPHETAMINE SACCHARATE, AMPHETAMINE ASPARTATE MONOHYDRATE, DEXTROAMPHETAMINE SULFATE AND AMPHETAMINE SULFATE 5; 5; 5; 5 MG/1; MG/1; MG/1; MG/1
20 CAPSULE, EXTENDED RELEASE ORAL EVERY MORNING
Qty: 30 CAPSULE | Refills: 0 | Status: CANCELLED | OUTPATIENT
Start: 2021-05-10 | End: 2021-06-09

## 2021-05-10 RX ORDER — DEXTROAMPHETAMINE SACCHARATE, AMPHETAMINE ASPARTATE MONOHYDRATE, DEXTROAMPHETAMINE SULFATE AND AMPHETAMINE SULFATE 5; 5; 5; 5 MG/1; MG/1; MG/1; MG/1
20 CAPSULE, EXTENDED RELEASE ORAL EVERY MORNING
Qty: 30 CAPSULE | Refills: 0 | Status: SHIPPED | OUTPATIENT
Start: 2021-05-10 | End: 2021-06-18

## 2021-05-10 NOTE — PROGRESS NOTES
[address] Note    Date: 5/10/2021                                               Subjective/Objective:     Chief Complaint   Patient presents with    Discuss Labs     needs shot        HPI   Patient is here for follow-up on bipolar disorder. Was started on Invega last year due to jose. Patient reports that moods are very stable lately. Denies SI/HI. 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 Patient also has a history of ADHD. Has been off Adderall for a while, likely back onto. Feels like his concentration is not as good as it used to be. Patient Active Problem List    Diagnosis Date Noted    Attention deficit disorder (ADD) without hyperactivity 03/16/2018       Past Medical History:   Diagnosis Date    Osteochondritis dissecans of knee bilateral       Current Outpatient Medications   Medication Sig Dispense Refill    amphetamine-dextroamphetamine (ADDERALL XR) 20 MG extended release capsule Take 1 capsule by mouth every morning for 30 days. 30 capsule 0     Current Facility-Administered Medications   Medication Dose Route Frequency Provider Last Rate Last Admin    paliperidone palmitate ER (INVEGA SUSTENNA) IM injection 234 mg  234 mg Intramuscular Once Alberto Roldan MD           Allergies   Allergen Reactions    Percocet [Oxycodone-Acetaminophen] Nausea And Vomiting       Review of Systems   No SI/HI. No fever, cough    Vitals:  /70   Pulse 75   Wt 198 lb (89.8 kg)   SpO2 99%     Physical Exam   General:  Well-appearing, NAD, alert, non-toxic  HEENT:  Normocephalic, atraumatic. CHEST/LUNGS: No dyspnea  EXTREMETIES: Normal movement of all extremities. No edema. No joint swelling.   SKIN:  No rash, no cellulitis, no bruising, no petechiae/purpura/vesicles/pustules/abscess  PSYCH:  A+O x 3; normal affect  NEURO:  GCS 15, CN2-12 grossly intact, no focal motor/sensory deficits, normal gait, normal speech      Assessment/Plan 80-year-old male with bipolar disorder. Moods are stable on Invega. Will give injection today. Patient advised to see psychiatrist soon. Patient has history of ADHD. Will resume his Adderall. Patient advised to follow-up with psychiatry as scheduled. Discussed with patient medication/s dosage, instructions, potential S/E, A/R and Drug Interaction  Tylenol or Motrin as needed for pain or fever  Advise to return here if worse or go to nearest ER  Encourage fluids  Pt discharged in stable condition at 16:37      1. Bipolar 1 disorder, manic, mild ([facility] [address]      2. Attention deficit disorder (ADD) without hyperactivity    - amphetamine-dextroamphetamine (ADDERALL XR) 20 MG extended release capsule; Take 1 capsule by mouth every morning for 30 days. Dispense: 30 capsule; Refill: 0       No orders of the defined types were placed in this encounter. No follow-ups on file.     Jorge Briones MD    5/10/2021  4:36 PM

## 2021-05-10 NOTE — LETTER
[address]  Phone: [phone]  Fax: [phone]    Raya Velazquez MD        May 10, 2021    To whom it may concern,    My patient, Mike Wood [birth year], had his invega shot today at my office. If you have any questions or concerns, please don't hesitate to call.     Sincerely,        Raya Velazquez MD

## 2021-05-11 LAB
SEX HORMONE BINDING GLOBULIN: 38 NMOL/L (ref 11–80)
TESTOSTERONE FREE-NONMALE: 124 PG/ML (ref 47–244)
TESTOSTERONE TOTAL: 620 NG/DL (ref 220–1000)

## 2021-05-17 ENCOUNTER — TELEPHONE (OUTPATIENT)
Dept: FAMILY MEDICINE CLINIC | Age: 21
End: 2021-05-17

## 2021-05-17 NOTE — TELEPHONE ENCOUNTER
Pt was notified per Sybil Kennedy note that he doesn't need a testosterone shot and pt wants to know if theres anyway he can get the shot ?      Couldn't leave this message on lab encounter note

## 2021-06-07 ENCOUNTER — NURSE ONLY (OUTPATIENT)
Dept: FAMILY MEDICINE CLINIC | Age: 21
End: 2021-06-07
Payer: COMMERCIAL

## 2021-06-07 DIAGNOSIS — F98.8 ATTENTION DEFICIT DISORDER (ADD) WITHOUT HYPERACTIVITY: Primary | ICD-10-CM

## 2021-06-07 PROCEDURE — 96372 THER/PROPH/DIAG INJ SC/IM: CPT | Performed by: FAMILY MEDICINE

## 2021-06-18 ENCOUNTER — OFFICE VISIT (OUTPATIENT)
Dept: FAMILY MEDICINE CLINIC | Age: 21
End: 2021-06-18
Payer: COMMERCIAL

## 2021-06-18 VITALS
SYSTOLIC BLOOD PRESSURE: 134 MMHG | WEIGHT: 191 LBS | DIASTOLIC BLOOD PRESSURE: 82 MMHG | OXYGEN SATURATION: 98 % | HEART RATE: 83 BPM

## 2021-06-18 DIAGNOSIS — F98.8 ATTENTION DEFICIT DISORDER (ADD) WITHOUT HYPERACTIVITY: ICD-10-CM

## 2021-06-18 PROCEDURE — 1036F TOBACCO NON-USER: CPT | Performed by: FAMILY MEDICINE

## 2021-06-18 PROCEDURE — G8427 DOCREV CUR MEDS BY ELIG CLIN: HCPCS | Performed by: FAMILY MEDICINE

## 2021-06-18 PROCEDURE — 99213 OFFICE O/P EST LOW 20 MIN: CPT | Performed by: FAMILY MEDICINE

## 2021-06-18 PROCEDURE — G8421 BMI NOT CALCULATED: HCPCS | Performed by: FAMILY MEDICINE

## 2021-06-18 RX ORDER — DEXTROAMPHETAMINE SACCHARATE, AMPHETAMINE ASPARTATE MONOHYDRATE, DEXTROAMPHETAMINE SULFATE AND AMPHETAMINE SULFATE 7.5; 7.5; 7.5; 7.5 MG/1; MG/1; MG/1; MG/1
30 CAPSULE, EXTENDED RELEASE ORAL EVERY MORNING
Qty: 30 CAPSULE | Refills: 0 | Status: SHIPPED | OUTPATIENT
Start: 2021-06-18 | End: 2021-09-23 | Stop reason: SDUPTHER

## 2021-06-18 RX ORDER — DEXTROAMPHETAMINE SACCHARATE, AMPHETAMINE ASPARTATE MONOHYDRATE, DEXTROAMPHETAMINE SULFATE AND AMPHETAMINE SULFATE 5; 5; 5; 5 MG/1; MG/1; MG/1; MG/1
20 CAPSULE, EXTENDED RELEASE ORAL EVERY MORNING
Qty: 30 CAPSULE | Refills: 0 | Status: CANCELLED | OUTPATIENT
Start: 2021-06-18 | End: 2021-07-18

## 2021-06-18 SDOH — ECONOMIC STABILITY: TRANSPORTATION INSECURITY
IN THE PAST 12 MONTHS, HAS THE LACK OF TRANSPORTATION KEPT YOU FROM MEDICAL APPOINTMENTS OR FROM GETTING MEDICATIONS?: NO

## 2021-06-18 SDOH — ECONOMIC STABILITY: FOOD INSECURITY: WITHIN THE PAST 12 MONTHS, THE FOOD YOU BOUGHT JUST DIDN'T LAST AND YOU DIDN'T HAVE MONEY TO GET MORE.: NEVER TRUE

## 2021-06-18 SDOH — ECONOMIC STABILITY: FOOD INSECURITY: WITHIN THE PAST 12 MONTHS, YOU WORRIED THAT YOUR FOOD WOULD RUN OUT BEFORE YOU GOT MONEY TO BUY MORE.: NEVER TRUE

## 2021-06-18 SDOH — ECONOMIC STABILITY: TRANSPORTATION INSECURITY
IN THE PAST 12 MONTHS, HAS LACK OF TRANSPORTATION KEPT YOU FROM MEETINGS, WORK, OR FROM GETTING THINGS NEEDED FOR DAILY LIVING?: NO

## 2021-06-18 ASSESSMENT — ENCOUNTER SYMPTOMS
RESPIRATORY NEGATIVE: 1
GASTROINTESTINAL NEGATIVE: 1

## 2021-06-18 ASSESSMENT — SOCIAL DETERMINANTS OF HEALTH (SDOH): HOW HARD IS IT FOR YOU TO PAY FOR THE VERY BASICS LIKE FOOD, HOUSING, MEDICAL CARE, AND HEATING?: NOT HARD AT ALL

## 2021-06-18 NOTE — PROGRESS NOTES
Neurological:      Mental Status: He is alert and oriented to person, place, and time. Psychiatric:         Behavior: Behavior normal.         Thought Content: Thought content normal.         Judgment: Judgment normal.                  An electronic signature was used to authenticate this note.     --Awa Curtis MD

## 2021-07-15 ENCOUNTER — NURSE ONLY (OUTPATIENT)
Dept: FAMILY MEDICINE CLINIC | Age: 21
End: 2021-07-15

## 2021-07-15 DIAGNOSIS — F98.8 ATTENTION DEFICIT DISORDER (ADD) WITHOUT HYPERACTIVITY: Primary | ICD-10-CM

## 2021-07-15 DIAGNOSIS — F31.11 BIPOLAR 1 DISORDER, MANIC, MILD (HCC): ICD-10-CM

## 2021-07-15 PROCEDURE — 96372 THER/PROPH/DIAG INJ SC/IM: CPT | Performed by: FAMILY MEDICINE

## 2021-08-10 ENCOUNTER — OFFICE VISIT (OUTPATIENT)
Dept: FAMILY MEDICINE CLINIC | Age: 21
End: 2021-08-10
Payer: COMMERCIAL

## 2021-08-10 VITALS
WEIGHT: 182 LBS | HEART RATE: 84 BPM | DIASTOLIC BLOOD PRESSURE: 80 MMHG | OXYGEN SATURATION: 99 % | SYSTOLIC BLOOD PRESSURE: 120 MMHG

## 2021-08-10 DIAGNOSIS — F31.11 BIPOLAR 1 DISORDER, MANIC, MILD (HCC): Primary | ICD-10-CM

## 2021-08-10 PROCEDURE — 1036F TOBACCO NON-USER: CPT | Performed by: FAMILY MEDICINE

## 2021-08-10 PROCEDURE — G8427 DOCREV CUR MEDS BY ELIG CLIN: HCPCS | Performed by: FAMILY MEDICINE

## 2021-08-10 PROCEDURE — 99213 OFFICE O/P EST LOW 20 MIN: CPT | Performed by: FAMILY MEDICINE

## 2021-08-10 PROCEDURE — G8421 BMI NOT CALCULATED: HCPCS | Performed by: FAMILY MEDICINE

## 2021-08-10 NOTE — PROGRESS NOTES
[address] Note    Date: 8/10/2021                                               Subjective/Objective:     Chief Complaint   Patient presents with    Injections     refferal psychiatrist.. takes him a while to come in       HPI   Patient is here for follow-up on bipolar disorder.  Was started on Invega last year due to jose.  Patient reports having side effects including feeling lackadaisical, docile.  Reports that he does not feel as smart. Reports that moods are stable inasmuch as he doesn't have weird thoughts or manic episodes. Denies SI/HI.  Is now between jobs. Lives at home with parents. Patient Active Problem List    Diagnosis Date Noted    Attention deficit disorder (ADD) without hyperactivity 03/16/2018       Past Medical History:   Diagnosis Date    Osteochondritis dissecans of knee bilateral       Current Outpatient Medications   Medication Sig Dispense Refill    amphetamine-dextroamphetamine (ADDERALL XR) 30 MG extended release capsule Take 1 capsule by mouth every morning for 30 days. 30 capsule 0     Current Facility-Administered Medications   Medication Dose Route Frequency Provider Last Rate Last Admin    paliperidone palmitate ER (INVEGA SUSTENNA) IM injection 156 mg  156 mg Intramuscular Once Alon Caicedo MD           Allergies   Allergen Reactions    Percocet [Oxycodone-Acetaminophen] Nausea And Vomiting       Review of Systems   No fever, no cough    Vitals:  /80   Pulse 84   Wt 182 lb (82.6 kg)   SpO2 99%     Physical Exam   General:  Well-appearing, NAD, alert, non-toxic  HEENT:  Normocephalic, atraumatic. CHEST/LUNGS: No dyspnea  EXTREMETIES: Normal movement of all extremities. No edema. No joint swelling.   SKIN:  No rash, no cellulitis, no bruising, no petechiae/purpura/vesicles/pustules/abscess  PSYCH:  A+O x 3; normal affect  NEURO:  GCS 15, CN2-12 grossly intact, no focal motor/sensory deficits, normal gait, normal speech      Assessment/Plan     49-year-old male with bipolar disorder with history of manic episodes. Patient is currently refusing his Invega injection, despite encouragement from myself and his grandmother, expressed in the office today. Psychiatry referral given to patient, stressed that he follow-up ASAP. Told him come back for his Invega injection anytime. Patient is not actively suicidal or danger to others. Discharged in fair condition at 2:05 PM.    1. Bipolar 1 disorder, manic, mild (HCC)         No orders of the defined types were placed in this encounter. No follow-ups on file.     Chelsea Olmos MD, MD    8/10/2021  2:06 PM

## 2021-09-23 ENCOUNTER — OFFICE VISIT (OUTPATIENT)
Dept: FAMILY MEDICINE CLINIC | Age: 21
End: 2021-09-23
Payer: COMMERCIAL

## 2021-09-23 VITALS
HEART RATE: 81 BPM | DIASTOLIC BLOOD PRESSURE: 73 MMHG | BODY MASS INDEX: 24.52 KG/M2 | SYSTOLIC BLOOD PRESSURE: 138 MMHG | WEIGHT: 181 LBS | HEIGHT: 72 IN

## 2021-09-23 DIAGNOSIS — F31.11 BIPOLAR 1 DISORDER, MANIC, MILD (HCC): ICD-10-CM

## 2021-09-23 DIAGNOSIS — F98.8 ATTENTION DEFICIT DISORDER (ADD) WITHOUT HYPERACTIVITY: Primary | ICD-10-CM

## 2021-09-23 PROCEDURE — 99213 OFFICE O/P EST LOW 20 MIN: CPT | Performed by: FAMILY MEDICINE

## 2021-09-23 PROCEDURE — G8427 DOCREV CUR MEDS BY ELIG CLIN: HCPCS | Performed by: FAMILY MEDICINE

## 2021-09-23 PROCEDURE — 1036F TOBACCO NON-USER: CPT | Performed by: FAMILY MEDICINE

## 2021-09-23 PROCEDURE — G8420 CALC BMI NORM PARAMETERS: HCPCS | Performed by: FAMILY MEDICINE

## 2021-09-23 RX ORDER — DEXTROAMPHETAMINE SACCHARATE, AMPHETAMINE ASPARTATE MONOHYDRATE, DEXTROAMPHETAMINE SULFATE AND AMPHETAMINE SULFATE 7.5; 7.5; 7.5; 7.5 MG/1; MG/1; MG/1; MG/1
30 CAPSULE, EXTENDED RELEASE ORAL EVERY MORNING
Qty: 30 CAPSULE | Refills: 0 | Status: SHIPPED | OUTPATIENT
Start: 2021-09-23 | End: 2022-05-23

## 2021-09-23 ASSESSMENT — ENCOUNTER SYMPTOMS
RESPIRATORY NEGATIVE: 1
GASTROINTESTINAL NEGATIVE: 1
ALLERGIC/IMMUNOLOGIC NEGATIVE: 1

## 2021-09-23 NOTE — PROGRESS NOTES
Carline Thakur (:  2000) is a 21 y.o. male,Established patient, here for evaluation of the following chief complaint(s):  ADHD         ASSESSMENT/PLAN:  1. Attention deficit disorder (ADD) without hyperactivity  -     amphetamine-dextroamphetamine (ADDERALL XR) 30 MG extended release capsule; Take 1 capsule by mouth every morning for 30 days. , Disp-30 capsule, R-0Normal  2. Bipolar 1 disorder, manic, mild ([address]  As noted below patient has stopped his Invega. He denies any symptoms at this time. By his history his family concurs with this decision. Return in about 3 months (around 2021). Subjective   SUBJECTIVE/OBJECTIVE:  HPI  Patient returns for follow-up of his ADD. He states the Adderall continues to work. He states he did drop out of school because it was not his thing. Currently he is not working because he does not have a car that is accessible to him. He also states his bipolar has been controlled although he has stopped his injectable Invega. He states his family is okay with this. He denies any significant symptoms and his review of systems is essentially normal/negative. Review of Systems   Constitutional: Negative. HENT: Negative. Respiratory: Negative. Cardiovascular: Negative. Gastrointestinal: Negative. Endocrine: Negative. Genitourinary: Negative. Musculoskeletal: Negative. Allergic/Immunologic: Negative. Neurological: Negative. Psychiatric/Behavioral: Negative. Negative for agitation, behavioral problems, dysphoric mood and sleep disturbance. The patient is not nervous/anxious. Objective   Physical Exam  Constitutional:       General: He is not in acute distress. Appearance: Normal appearance. He is not ill-appearing, toxic-appearing or diaphoretic. HENT:      Head: Normocephalic and atraumatic. Eyes:      Conjunctiva/sclera: Conjunctivae normal.   Cardiovascular:      Rate and Rhythm: Normal rate and regular rhythm. Pulmonary:      Effort: Pulmonary effort is normal.      Breath sounds: Normal breath sounds. No wheezing or rales. Skin:     General: Skin is warm and dry. Neurological:      Mental Status: He is alert and oriented to person, place, and time. Psychiatric:         Behavior: Behavior normal.         Thought Content: Thought content normal.         Judgment: Judgment normal.                  An electronic signature was used to authenticate this note.     --Kirstie Schwartz MD

## 2022-05-23 ENCOUNTER — TELEMEDICINE (OUTPATIENT)
Dept: FAMILY MEDICINE CLINIC | Age: 22
End: 2022-05-23
Payer: COMMERCIAL

## 2022-05-23 DIAGNOSIS — F98.8 ATTENTION DEFICIT DISORDER (ADD) WITHOUT HYPERACTIVITY: ICD-10-CM

## 2022-05-23 PROCEDURE — G8427 DOCREV CUR MEDS BY ELIG CLIN: HCPCS | Performed by: FAMILY MEDICINE

## 2022-05-23 PROCEDURE — 99213 OFFICE O/P EST LOW 20 MIN: CPT | Performed by: FAMILY MEDICINE

## 2022-05-23 RX ORDER — DEXTROAMPHETAMINE SACCHARATE, AMPHETAMINE ASPARTATE, DEXTROAMPHETAMINE SULFATE AND AMPHETAMINE SULFATE 7.5; 7.5; 7.5; 7.5 MG/1; MG/1; MG/1; MG/1
30 TABLET ORAL DAILY
Qty: 30 TABLET | Refills: 0 | Status: SHIPPED | OUTPATIENT
Start: 2022-05-23 | End: 2022-06-22

## 2022-05-23 ASSESSMENT — PATIENT HEALTH QUESTIONNAIRE - PHQ9
4. FEELING TIRED OR HAVING LITTLE ENERGY: 0
8. MOVING OR SPEAKING SO SLOWLY THAT OTHER PEOPLE COULD HAVE NOTICED. OR THE OPPOSITE, BEING SO FIGETY OR RESTLESS THAT YOU HAVE BEEN MOVING AROUND A LOT MORE THAN USUAL: 0
2. FEELING DOWN, DEPRESSED OR HOPELESS: 0
3. TROUBLE FALLING OR STAYING ASLEEP: 0
SUM OF ALL RESPONSES TO PHQ QUESTIONS 1-9: 0
1. LITTLE INTEREST OR PLEASURE IN DOING THINGS: 0
7. TROUBLE CONCENTRATING ON THINGS, SUCH AS READING THE NEWSPAPER OR WATCHING TELEVISION: 0
SUM OF ALL RESPONSES TO PHQ QUESTIONS 1-9: 0
10. IF YOU CHECKED OFF ANY PROBLEMS, HOW DIFFICULT HAVE THESE PROBLEMS MADE IT FOR YOU TO DO YOUR WORK, TAKE CARE OF THINGS AT HOME, OR GET ALONG WITH OTHER PEOPLE: 0
9. THOUGHTS THAT YOU WOULD BE BETTER OFF DEAD, OR OF HURTING YOURSELF: 0
5. POOR APPETITE OR OVEREATING: 0
SUM OF ALL RESPONSES TO PHQ9 QUESTIONS 1 & 2: 0
6. FEELING BAD ABOUT YOURSELF - OR THAT YOU ARE A FAILURE OR HAVE LET YOURSELF OR YOUR FAMILY DOWN: 0

## 2022-05-23 ASSESSMENT — ENCOUNTER SYMPTOMS
RESPIRATORY NEGATIVE: 1
GASTROINTESTINAL NEGATIVE: 1

## 2022-05-23 NOTE — PROGRESS NOTES
2022    TELEHEALTH EVALUATION -- Audio/Visual (During HAHTP-89 public health emergency)    HPI:    Demond Harman (:  2000) has requested an audio/video evaluation for the following concern(s):    Patient states he is planning on going back to school and would like to restart Adderall. He prefers the short acting Adderall. He states he thinks this will help his focus better to improve his chances of succeeding in school. Review of Systems   Constitutional: Negative. Respiratory: Negative. Cardiovascular: Negative. Gastrointestinal: Negative. Endocrine: Negative. Genitourinary: Negative. Musculoskeletal: Negative. Neurological: Negative. Psychiatric/Behavioral:        ADD       Prior to Visit Medications    Medication Sig Taking? Authorizing Provider   amphetamine-dextroamphetamine (ADDERALL, 30MG,) 30 MG tablet Take 1 tablet by mouth daily for 30 days.  Yes Cas Peralta MD       Social History     Tobacco Use    Smoking status: Never Smoker    Smokeless tobacco: Never Used   Substance Use Topics    Alcohol use: No     Alcohol/week: 0.0 standard drinks    Drug use: No        Allergies   Allergen Reactions    Percocet [Oxycodone-Acetaminophen] Nausea And Vomiting   ,   Past Medical History:   Diagnosis Date    Osteochondritis dissecans of knee bilateral   ,   Past Surgical History:   Procedure Laterality Date    HERNIA REPAIR      KNEE ARTHROSCOPY Bilateral 13    w/ retrograde drilling OCD & Internal fixation   ,   Social History     Tobacco Use    Smoking status: Never Smoker    Smokeless tobacco: Never Used   Substance Use Topics    Alcohol use: No     Alcohol/week: 0.0 standard drinks    Drug use: No   ,   Family History   Problem Relation Age of Onset    Prostate Cancer Maternal Grandfather     Hypertension Maternal Grandfather    ,   Immunization History   Administered Date(s) Administered    DTaP 2001, 2001, 2001, 2002, 09/19/2006    HPV 9-valent Rona Barn) 07/30/2015, 10/20/2015    Hepatitis B 2000, 2000, 09/18/2001    Hib, unspecified 01/29/2001, 03/28/2001, 05/19/2001, 11/28/2001    Influenza A ([id number]) Vaccine IM 12/02/2009, 01/23/2010    Influenza Virus Vaccine 10/20/2015    Influenza, Juju Natalia, IM, PF (6 mo and older Fluzone, Flulaval, Fluarix, and 3 yrs and older Afluria) 12/07/2020    MMR 02/26/2002, 09/19/2006    Meningococcal MCV4P (Menactra) 07/30/2015    Pneumococcal Conjugate 7-valent (Lei Molt) 01/29/2001, 03/28/2001, 05/19/2001, 11/28/2001    Polio IPV (IPOL) 01/29/2001, 03/28/2001, 05/07/2002, 09/19/2006    Tdap (Boostrix, Adacel) 01/14/2014, 03/01/2018    Varicella (Varivax) 02/26/2002, 07/30/2015   ,   Health Maintenance   Topic Date Due    COVID-19 Vaccine (1) Never done    HIV screen  Never done    HPV vaccine (3 - Male 2-dose series) 01/30/2016    Hepatitis C screen  Never done    Depression Monitoring  08/31/2021    Flu vaccine (Season Ended) 09/01/2022    DTaP/Tdap/Td vaccine (9 - Td or Tdap) 02/02/2030    Hepatitis B vaccine  Completed    Hib vaccine  Completed    Varicella vaccine  Completed    Meningococcal (ACWY) vaccine  Completed    Hepatitis A vaccine  Aged Out    Pneumococcal 0-64 years Vaccine  Aged Out       PHYSICAL EXAMINATION:  [ INSTRUCTIONS:  \"[x]\" Indicates a positive item  \"[]\" Indicates a negative item  -- DELETE ALL ITEMS NOT EXAMINED]  Vital Signs: (As obtained by patient/caregiver or practitioner observation)    Respiratory ratenormal         Constitutional: [x] Appears well-developed and well-nourished [x] No apparent distress      [] Abnormal-   Mental status  [x] Alert and awake  [x] Oriented to person/place/time [x]Able to follow commands      Eyes:  EOM    [x]  Normal  [] Abnormal-  Sclera  [x]  Normal  [] Abnormal -         Discharge [x]  None visible  [] Abnormal -    HENT:   [x] Normocephalic, atraumatic.   [] Abnormal   [] Mouth/Throat: Mucous membranes are moist.     External Ears [x] Normal  [] Abnormal-     Neck: [x] No visualized mass     Pulmonary/Chest: [x] Respiratory effort normal.  [] No visualized signs of difficulty breathing or respiratory distress        [] Abnormal-      Musculoskeletal:   [] Normal gait with no signs of ataxia         [x] Normal range of motion of neck        [] Abnormal-       Neurological:        [x] No Facial Asymmetry (Cranial nerve 7 motor function) (limited exam to video visit)          [x] No gaze palsy        [] Abnormal-         Skin:        [x] No significant exanthematous lesions or discoloration noted on facial skin         [] Abnormal-            Psychiatric:       [x] Normal Affect [x] No Hallucinations        [] Abnormal-     Other pertinent observable physical exam findings-     ASSESSMENT/PLAN:  1. Attention deficit disorder (ADD) without hyperactivity  - amphetamine-dextroamphetamine (ADDERALL, 30MG,) 30 MG tablet; Take 1 tablet by mouth daily for 30 days. Dispense: 30 tablet; Refill: 0  Return 3 months or sooner if doses not adequate  OARRS report reviewed and no inconsistencies noted   The patient understands the risks of dependency/addiction with Adderall and will take as little as possible and discontinue as soon as possible       No follow-ups on file. Eileen Foster is a 24 y.o. male being evaluated by a Virtual Visit (video visit) encounter to address concerns as mentioned above. A caregiver was present when appropriate. Due to this being a TeleHealth encounter (During IIO-59 public health emergency), evaluation of the following organ systems was limited: Vitals/Constitutional/EENT/Resp/CV/GI//MS/Neuro/Skin/Heme-Lymph-Imm.   Pursuant to the emergency declaration under the [facility], [address] [place] [address] authority and the [employer] and [employer] Act, this Virtual Visit was conducted with patient's (and/or legal guardian's) consent, to reduce the patient's risk of exposure to COVID-19 and provide necessary medical care. The patient (and/or legal guardian) has also been advised to contact this office for worsening conditions or problems, and seek emergency medical treatment and/or call 911 if deemed necessary. Patient identification was verified at the start of the visit: Yes    Total time spent on this encounter: 20 minutes    Services were provided through a video synchronous discussion virtually to substitute for in-person clinic visit. Patient and provider were located at their individual homes. --Cas Peralta MD on 5/23/2022 at 6:21 PM    An electronic signature was used to authenticate this note.

## 2023-06-05 ENCOUNTER — OFFICE VISIT (OUTPATIENT)
Dept: FAMILY MEDICINE CLINIC | Age: 23
End: 2023-06-05

## 2023-06-05 VITALS
SYSTOLIC BLOOD PRESSURE: 106 MMHG | DIASTOLIC BLOOD PRESSURE: 62 MMHG | OXYGEN SATURATION: 96 % | BODY MASS INDEX: 24.6 KG/M2 | WEIGHT: 181.4 LBS | HEART RATE: 89 BPM

## 2023-06-05 DIAGNOSIS — M25.531 RIGHT WRIST PAIN: ICD-10-CM

## 2023-06-05 DIAGNOSIS — F31.12 BIPOLAR 1 DISORDER WITH MODERATE MANIA (HCC): Primary | ICD-10-CM

## 2023-06-05 PROCEDURE — 99214 OFFICE O/P EST MOD 30 MIN: CPT | Performed by: FAMILY MEDICINE

## 2023-06-05 RX ORDER — HALOPERIDOL 5 MG/1
1 TABLET ORAL 2 TIMES DAILY
COMMUNITY
Start: 2023-05-30

## 2023-06-05 RX ORDER — RISPERIDONE 3 MG/1
1 TABLET ORAL 2 TIMES DAILY
COMMUNITY
Start: 2023-05-30

## 2023-06-05 RX ORDER — HYDROXYZINE 50 MG/1
1 TABLET, FILM COATED ORAL PRN
COMMUNITY
Start: 2023-05-30

## 2023-06-05 RX ORDER — RISPERIDONE 1 MG/1
1 TABLET ORAL PRN
COMMUNITY
Start: 2023-05-05

## 2023-06-05 RX ORDER — DIVALPROEX SODIUM 250 MG/1
3 TABLET, DELAYED RELEASE ORAL 2 TIMES DAILY
COMMUNITY
Start: 2023-04-06

## 2023-06-05 SDOH — ECONOMIC STABILITY: HOUSING INSECURITY
IN THE LAST 12 MONTHS, WAS THERE A TIME WHEN YOU DID NOT HAVE A STEADY PLACE TO SLEEP OR SLEPT IN A SHELTER (INCLUDING NOW)?: YES

## 2023-06-05 SDOH — ECONOMIC STABILITY: INCOME INSECURITY: HOW HARD IS IT FOR YOU TO PAY FOR THE VERY BASICS LIKE FOOD, HOUSING, MEDICAL CARE, AND HEATING?: SOMEWHAT HARD

## 2023-06-05 SDOH — ECONOMIC STABILITY: FOOD INSECURITY: WITHIN THE PAST 12 MONTHS, THE FOOD YOU BOUGHT JUST DIDN'T LAST AND YOU DIDN'T HAVE MONEY TO GET MORE.: NEVER TRUE

## 2023-06-05 SDOH — ECONOMIC STABILITY: FOOD INSECURITY: WITHIN THE PAST 12 MONTHS, YOU WORRIED THAT YOUR FOOD WOULD RUN OUT BEFORE YOU GOT MONEY TO BUY MORE.: NEVER TRUE

## 2023-06-05 ASSESSMENT — PATIENT HEALTH QUESTIONNAIRE - PHQ9
SUM OF ALL RESPONSES TO PHQ QUESTIONS 1-9: 8
SUM OF ALL RESPONSES TO PHQ QUESTIONS 1-9: 8
10. IF YOU CHECKED OFF ANY PROBLEMS, HOW DIFFICULT HAVE THESE PROBLEMS MADE IT FOR YOU TO DO YOUR WORK, TAKE CARE OF THINGS AT HOME, OR GET ALONG WITH OTHER PEOPLE: 2
2. FEELING DOWN, DEPRESSED OR HOPELESS: 0
8. MOVING OR SPEAKING SO SLOWLY THAT OTHER PEOPLE COULD HAVE NOTICED. OR THE OPPOSITE, BEING SO FIGETY OR RESTLESS THAT YOU HAVE BEEN MOVING AROUND A LOT MORE THAN USUAL: 3
SUM OF ALL RESPONSES TO PHQ QUESTIONS 1-9: 8
9. THOUGHTS THAT YOU WOULD BE BETTER OFF DEAD, OR OF HURTING YOURSELF: 0
6. FEELING BAD ABOUT YOURSELF - OR THAT YOU ARE A FAILURE OR HAVE LET YOURSELF OR YOUR FAMILY DOWN: 0
7. TROUBLE CONCENTRATING ON THINGS, SUCH AS READING THE NEWSPAPER OR WATCHING TELEVISION: 0
5. POOR APPETITE OR OVEREATING: 0
4. FEELING TIRED OR HAVING LITTLE ENERGY: 3
1. LITTLE INTEREST OR PLEASURE IN DOING THINGS: 1
SUM OF ALL RESPONSES TO PHQ9 QUESTIONS 1 & 2: 1
SUM OF ALL RESPONSES TO PHQ QUESTIONS 1-9: 8
3. TROUBLE FALLING OR STAYING ASLEEP: 1

## 2023-06-05 ASSESSMENT — ENCOUNTER SYMPTOMS
RESPIRATORY NEGATIVE: 1
GASTROINTESTINAL NEGATIVE: 1

## 2023-06-05 NOTE — PROGRESS NOTES
Ana Little (:  2000) is a 25 y.o. male,Established patient, here for evaluation of the following chief complaint(s):  Depression (/)         ASSESSMENT/PLAN:  1. Bipolar 1 disorder with moderate joes ([address]  -     Ambulatory referral to Psychiatry  Greater than 40-minute discussion with the patient regarding the medication side effects, the diagnosis, etc.  We arrived at he would go back and see the psychiatrist at [facility] which he is okay with. He needs to discuss with him the side effects of medication, he was out of the original diagnosis, etc.  I stated I would be happy to see him when desired however without knowing the full history, that I would not be adjusting, adding or discontinuing any of his medication. 2. Right wrist pain  -     XR WRIST RIGHT (MIN 3 VIEWS); Future      Return if symptoms worsen or fail to improve. Subjective   SUBJECTIVE/OBJECTIVE:  HPI  Hospitalized at [facility] xs 3 weeks last month and states he does not like the current medication he is on. He had previously been at [facility] 4 years ago and initial diagnosed as BiPolar. States he has not felt like for 4 years due to medication which he feels is taking his brain in a different direction. He feels like he will never have a family because he has no feeling down there. He has no confidence. He feels when he was at [facility] and they gave him Haldol that he peed something out of his body and changed his mind. Also R wrist pain since xs 1 month. No injury or other etiology other than he states he fell on his wrist when he was playing high school basketball. He states that is not been a continuous issue up until 1 month ago. Review of Systems   Constitutional:  Positive for activity change and appetite change. Respiratory: Negative. Cardiovascular: Negative. Gastrointestinal: Negative. Endocrine: Negative.     Genitourinary:         ED   Musculoskeletal:  Positive for

## 2023-06-29 DIAGNOSIS — F98.8 ATTENTION DEFICIT DISORDER (ADD) WITHOUT HYPERACTIVITY: ICD-10-CM

## 2023-06-29 RX ORDER — DEXTROAMPHETAMINE SACCHARATE, AMPHETAMINE ASPARTATE, DEXTROAMPHETAMINE SULFATE AND AMPHETAMINE SULFATE 7.5; 7.5; 7.5; 7.5 MG/1; MG/1; MG/1; MG/1
30 TABLET ORAL DAILY
Qty: 30 TABLET | Refills: 0 | OUTPATIENT
Start: 2023-06-29 | End: 2023-07-29

## 2023-07-14 ENCOUNTER — TELEPHONE (OUTPATIENT)
Dept: FAMILY MEDICINE CLINIC | Age: 23
End: 2023-07-14

## 2023-07-14 DIAGNOSIS — F98.8 ATTENTION DEFICIT DISORDER (ADD) WITHOUT HYPERACTIVITY: ICD-10-CM

## 2023-07-14 RX ORDER — DEXTROAMPHETAMINE SACCHARATE, AMPHETAMINE ASPARTATE, DEXTROAMPHETAMINE SULFATE AND AMPHETAMINE SULFATE 7.5; 7.5; 7.5; 7.5 MG/1; MG/1; MG/1; MG/1
30 TABLET ORAL DAILY
Qty: 30 TABLET | Refills: 0 | OUTPATIENT
Start: 2023-07-14 | End: 2023-08-13

## 2023-07-19 ENCOUNTER — TELEPHONE (OUTPATIENT)
Dept: FAMILY MEDICINE CLINIC | Age: 23
End: 2023-07-19

## 2023-07-19 NOTE — TELEPHONE ENCOUNTER
Patient called back and still hasn't receive his medication. Please make sure the dosage is increased to 45 mg.  Please advise

## 2023-07-19 NOTE — TELEPHONE ENCOUNTER
As noted before he has not been on this medication for 1 year.   There does not seem to be any need to restart him on this medication particularly since the psychiatrist he was seeing, did not keep him on this

## 2023-07-19 NOTE — TELEPHONE ENCOUNTER
When he came to the office, he has been off this medication for 1 year. He told  me he did not really feel like it did much and we did not put him back on it. When he decided he needed to go back on this medication?

## 2023-07-19 NOTE — TELEPHONE ENCOUNTER
Tried calling pt and it went straight to VM. Left  for pt to call back and receive Dr Good Szymanski response.

## 2023-07-19 NOTE — TELEPHONE ENCOUNTER
Patient is  calling back stating that he just felt like a increase in the dosage would help him .  Please advise

## 2023-07-20 NOTE — TELEPHONE ENCOUNTER
Pt called back and said that he doesn't take the below medicine all the time but he does use it every now and then.  Pt says he has things to do this weekend and he said it would help him a lot if he could get the dosage increased to the 45 mg      amphetamine-dextroamphetamine (ADDERALL XR) 30 MG extended release capsule

## 2023-07-20 NOTE — TELEPHONE ENCOUNTER
In previous encounter patient said he was taking the medication on and off as needed. Pt already received robertson's response in different encounter.  Pt would still like medication to be called in since he really needs it for this weekend and increased to 45 mg    amphetamine-dextroamphetamine (ADDERALL, 30MG,) 30 MG tablet

## 2023-07-20 NOTE — TELEPHONE ENCOUNTER
Unfortunately since he has not been on it for 1 year, #1 I would not increase it and #2 he will need office visit to discuss restarting the medication  If he would like to see one of the other providers to discuss this, this week he can do so.

## 2024-04-08 ENCOUNTER — OFFICE VISIT (OUTPATIENT)
Dept: FAMILY MEDICINE CLINIC | Age: 24
End: 2024-04-08

## 2024-04-08 VITALS
BODY MASS INDEX: 24.24 KG/M2 | WEIGHT: 179 LBS | HEIGHT: 72 IN | SYSTOLIC BLOOD PRESSURE: 122 MMHG | DIASTOLIC BLOOD PRESSURE: 76 MMHG | HEART RATE: 76 BPM | OXYGEN SATURATION: 99 %

## 2024-04-08 DIAGNOSIS — M54.50 ACUTE MIDLINE LOW BACK PAIN WITHOUT SCIATICA: ICD-10-CM

## 2024-04-08 DIAGNOSIS — M54.2 NECK PAIN: Primary | ICD-10-CM

## 2024-04-08 PROCEDURE — 99213 OFFICE O/P EST LOW 20 MIN: CPT | Performed by: FAMILY MEDICINE

## 2024-04-08 ASSESSMENT — PATIENT HEALTH QUESTIONNAIRE - PHQ9
7. TROUBLE CONCENTRATING ON THINGS, SUCH AS READING THE NEWSPAPER OR WATCHING TELEVISION: NOT AT ALL
4. FEELING TIRED OR HAVING LITTLE ENERGY: NOT AT ALL
SUM OF ALL RESPONSES TO PHQ QUESTIONS 1-9: 3
8. MOVING OR SPEAKING SO SLOWLY THAT OTHER PEOPLE COULD HAVE NOTICED. OR THE OPPOSITE, BEING SO FIGETY OR RESTLESS THAT YOU HAVE BEEN MOVING AROUND A LOT MORE THAN USUAL: NOT AT ALL
6. FEELING BAD ABOUT YOURSELF - OR THAT YOU ARE A FAILURE OR HAVE LET YOURSELF OR YOUR FAMILY DOWN: NOT AT ALL
2. FEELING DOWN, DEPRESSED OR HOPELESS: SEVERAL DAYS
SUM OF ALL RESPONSES TO PHQ QUESTIONS 1-9: 3
1. LITTLE INTEREST OR PLEASURE IN DOING THINGS: NOT AT ALL
9. THOUGHTS THAT YOU WOULD BE BETTER OFF DEAD, OR OF HURTING YOURSELF: NOT AT ALL
SUM OF ALL RESPONSES TO PHQ QUESTIONS 1-9: 3
10. IF YOU CHECKED OFF ANY PROBLEMS, HOW DIFFICULT HAVE THESE PROBLEMS MADE IT FOR YOU TO DO YOUR WORK, TAKE CARE OF THINGS AT HOME, OR GET ALONG WITH OTHER PEOPLE: NOT DIFFICULT AT ALL
5. POOR APPETITE OR OVEREATING: SEVERAL DAYS
SUM OF ALL RESPONSES TO PHQ9 QUESTIONS 1 & 2: 1
3. TROUBLE FALLING OR STAYING ASLEEP: SEVERAL DAYS
SUM OF ALL RESPONSES TO PHQ QUESTIONS 1-9: 3

## 2024-04-08 ASSESSMENT — ENCOUNTER SYMPTOMS
GASTROINTESTINAL NEGATIVE: 1
RESPIRATORY NEGATIVE: 1

## 2024-04-08 NOTE — PROGRESS NOTES
Myron Montiel (:  2000) is a 23 y.o. male,Established patient, here for evaluation of the following chief complaint(s):  Back Pain (Pt has had the pain since for about a week now )         ASSESSMENT/PLAN:  1. Neck pain  -     XR CERVICAL SPINE (2-3 VIEWS); Future  Reassurance of normal exam  2. Acute midline low back pain without sciatica  -     XR LUMBAR SPINE (2-3 VIEWS); Future  Reassurance of normal    Return if symptoms worsen or fail to improve.         Subjective   SUBJECTIVE/OBJECTIVE:  HPI  Feels that something is wrong with back or neck and that he would have a hard time exercising, otherwise states he feels fine.  Symptoms xs 1 week. Has been seen at ER for other issues.  States the medicine he is on is driving him crazy. He is assigned a Dr through probate  Review of Systems   Constitutional:  Positive for activity change.   Respiratory: Negative.     Cardiovascular: Negative.    Gastrointestinal: Negative.    Endocrine: Negative.    Genitourinary: Negative.    Musculoskeletal: Negative.    Neurological: Negative.    Psychiatric/Behavioral:  Positive for decreased concentration and dysphoric mood.           Objective   Physical Exam  Constitutional:       General: He is not in acute distress.     Appearance: He is not ill-appearing, toxic-appearing or diaphoretic.   HENT:      Head: Normocephalic and atraumatic.   Eyes:      Conjunctiva/sclera: Conjunctivae normal.   Musculoskeletal:      Cervical back: Normal range of motion and neck supple. No edema, erythema, signs of trauma, rigidity, torticollis or crepitus. No pain with movement, spinous process tenderness or muscular tenderness. Normal range of motion.      Lumbar back: No swelling, edema, deformity, signs of trauma, spasms, tenderness or bony tenderness. Normal range of motion. No scoliosis.   Skin:     General: Skin is warm and dry.   Neurological:      Mental Status: He is alert and oriented to person, place, and time.

## 2024-05-30 ENCOUNTER — TELEPHONE (OUTPATIENT)
Dept: FAMILY MEDICINE CLINIC | Age: 24
End: 2024-05-30